# Patient Record
Sex: FEMALE | Race: BLACK OR AFRICAN AMERICAN | NOT HISPANIC OR LATINO | URBAN - METROPOLITAN AREA
[De-identification: names, ages, dates, MRNs, and addresses within clinical notes are randomized per-mention and may not be internally consistent; named-entity substitution may affect disease eponyms.]

---

## 2019-05-09 ENCOUNTER — HISTORICAL (OUTPATIENT)
Dept: PREADMISSION TESTING | Facility: HOSPITAL | Age: 49
End: 2019-05-09

## 2019-05-09 ENCOUNTER — HISTORICAL (OUTPATIENT)
Dept: BARIATRICS | Facility: HOSPITAL | Age: 49
End: 2019-05-09

## 2019-05-09 LAB — H PYLORI AB SER IA-ACNC: NEGATIVE

## 2019-05-22 ENCOUNTER — HISTORICAL (OUTPATIENT)
Dept: ADMINISTRATIVE | Facility: HOSPITAL | Age: 49
End: 2019-05-22

## 2019-05-22 LAB
ABS NEUT (OLG): 4.3 X10(3)/MCL (ref 2.1–9.2)
ALBUMIN SERPL-MCNC: 4 GM/DL (ref 3.4–5)
ALBUMIN/GLOB SERPL: 0.98 {RATIO} (ref 1.5–2.5)
ALP SERPL-CCNC: 61 UNIT/L (ref 38–126)
ALT SERPL-CCNC: 28 UNIT/L (ref 7–52)
AST SERPL-CCNC: 33 UNIT/L (ref 15–37)
BILIRUB SERPL-MCNC: 0.3 MG/DL (ref 0.2–1)
BILIRUBIN DIRECT+TOT PNL SERPL-MCNC: 0.1 MG/DL (ref 0–0.5)
BILIRUBIN DIRECT+TOT PNL SERPL-MCNC: 0.2 MG/DL
BUN SERPL-MCNC: 18 MG/DL (ref 7–18)
CALCIUM SERPL-MCNC: 9.4 MG/DL (ref 8.5–10)
CHLORIDE SERPL-SCNC: 100 MMOL/L (ref 98–107)
CHOLEST SERPL-MCNC: 256 MG/DL (ref 0–200)
CHOLEST/HDLC SERPL: 4.4 {RATIO}
CO2 SERPL-SCNC: 26 MMOL/L (ref 21–32)
CREAT SERPL-MCNC: 0.83 MG/DL (ref 0.6–1.3)
ERYTHROCYTE [DISTWIDTH] IN BLOOD BY AUTOMATED COUNT: 13.6 % (ref 11.5–17)
GLOBULIN SER-MCNC: 4.1 GM/DL (ref 1.2–3)
GLUCOSE SERPL-MCNC: 111 MG/DL (ref 74–106)
HCT VFR BLD AUTO: 35.2 % (ref 37–47)
HDLC SERPL-MCNC: 58 MG/DL (ref 35–60)
HGB BLD-MCNC: 11.5 GM/DL (ref 12–16)
LDLC SERPL CALC-MCNC: 122 MG/DL (ref 0–129)
LYMPHOCYTES # BLD AUTO: 2.4 X10(3)/MCL (ref 0.6–3.4)
LYMPHOCYTES NFR BLD AUTO: 33.7 % (ref 13–40)
MCH RBC QN AUTO: 27.6 PG (ref 27–31.2)
MCHC RBC AUTO-ENTMCNC: 33 GM/DL (ref 32–36)
MCV RBC AUTO: 85 FL (ref 80–94)
MONOCYTES # BLD AUTO: 0.5 X10(3)/MCL (ref 0.1–1.3)
MONOCYTES NFR BLD AUTO: 7 % (ref 0.1–24)
NEUTROPHILS NFR BLD AUTO: 59.3 % (ref 47–80)
PLATELET # BLD AUTO: 335 X10(3)/MCL (ref 130–400)
PMV BLD AUTO: 9 FL (ref 9.4–12.4)
POTASSIUM SERPL-SCNC: 4.7 MMOL/L (ref 3.5–5.1)
PROT SERPL-MCNC: 8.1 GM/DL (ref 6.4–8.2)
RBC # BLD AUTO: 4.16 X10(6)/MCL (ref 4.2–5.4)
SODIUM SERPL-SCNC: 135 MMOL/L (ref 136–145)
TRIGL SERPL-MCNC: 191 MG/DL (ref 30–150)
TSH SERPL-ACNC: 0.93 MIU/ML (ref 0.35–4.94)
VLDLC SERPL CALC-MCNC: 38.2 MG/DL
WBC # SPEC AUTO: 7.2 X10(3)/MCL (ref 4.5–11.5)

## 2019-06-07 ENCOUNTER — HISTORICAL (OUTPATIENT)
Dept: BARIATRICS | Facility: HOSPITAL | Age: 49
End: 2019-06-07

## 2019-06-14 ENCOUNTER — HISTORICAL (OUTPATIENT)
Dept: SURGERY | Facility: HOSPITAL | Age: 49
End: 2019-06-14

## 2019-07-12 ENCOUNTER — HISTORICAL (OUTPATIENT)
Dept: BARIATRICS | Facility: HOSPITAL | Age: 49
End: 2019-07-12

## 2019-07-16 ENCOUNTER — HISTORICAL (OUTPATIENT)
Dept: BARIATRICS | Facility: HOSPITAL | Age: 49
End: 2019-07-16

## 2019-08-01 ENCOUNTER — HISTORICAL (OUTPATIENT)
Dept: BARIATRICS | Facility: HOSPITAL | Age: 49
End: 2019-08-01

## 2019-08-19 ENCOUNTER — HISTORICAL (OUTPATIENT)
Dept: BARIATRICS | Facility: HOSPITAL | Age: 49
End: 2019-08-19

## 2019-10-04 ENCOUNTER — HISTORICAL (OUTPATIENT)
Dept: ADMINISTRATIVE | Facility: HOSPITAL | Age: 49
End: 2019-10-04

## 2019-10-04 LAB
ALBUMIN SERPL-MCNC: 4.4 GM/DL (ref 3.4–5)
ALBUMIN/GLOB SERPL: 1.2 RATIO (ref 1.1–2)
ALP SERPL-CCNC: 84 UNIT/L (ref 38–126)
ALT SERPL-CCNC: 24 UNIT/L (ref 12–78)
AST SERPL-CCNC: 20 UNIT/L (ref 15–37)
BILIRUB SERPL-MCNC: 0.8 MG/DL (ref 0.2–1)
BILIRUBIN DIRECT+TOT PNL SERPL-MCNC: 0.1 MG/DL (ref 0–0.5)
BILIRUBIN DIRECT+TOT PNL SERPL-MCNC: 0.7 MG/DL (ref 0–0.8)
BUN SERPL-MCNC: 22 MG/DL (ref 7–18)
CALCIUM SERPL-MCNC: 10 MG/DL (ref 8.5–10.1)
CHLORIDE SERPL-SCNC: 103 MMOL/L (ref 98–107)
CO2 SERPL-SCNC: 29 MMOL/L (ref 21–32)
CREAT SERPL-MCNC: 1.08 MG/DL (ref 0.55–1.02)
ERYTHROCYTE [DISTWIDTH] IN BLOOD BY AUTOMATED COUNT: 14.6 % (ref 11.5–17)
GLOBULIN SER-MCNC: 3.6 GM/DL (ref 2.4–3.5)
GLUCOSE SERPL-MCNC: 107 MG/DL (ref 74–106)
HCT VFR BLD AUTO: 36.4 % (ref 37–47)
HGB BLD-MCNC: 11.1 GM/DL (ref 12–16)
IRON SATN MFR SERPL: 8.2 % (ref 20–50)
IRON SERPL-MCNC: 29 MCG/DL (ref 50–175)
MCH RBC QN AUTO: 26.1 PG (ref 27–31)
MCHC RBC AUTO-ENTMCNC: 30.5 GM/DL (ref 33–36)
MCV RBC AUTO: 85.6 FL (ref 80–94)
PLATELET # BLD AUTO: 315 X10(3)/MCL (ref 130–400)
PMV BLD AUTO: 10.3 FL (ref 9.4–12.4)
POTASSIUM SERPL-SCNC: 3.9 MMOL/L (ref 3.5–5.1)
PROT SERPL-MCNC: 8 GM/DL (ref 6.4–8.2)
RBC # BLD AUTO: 4.25 X10(6)/MCL (ref 4.2–5.4)
SODIUM SERPL-SCNC: 139 MMOL/L (ref 136–145)
TIBC SERPL-MCNC: 353 MCG/DL (ref 250–450)
TRANSFERRIN SERPL-MCNC: 272 MG/DL (ref 200–360)
VIT B12 SERPL-MCNC: 760 PG/ML (ref 193–986)
WBC # SPEC AUTO: 4.6 X10(3)/MCL (ref 4.5–11.5)

## 2019-10-08 ENCOUNTER — HISTORICAL (OUTPATIENT)
Dept: BARIATRICS | Facility: HOSPITAL | Age: 49
End: 2019-10-08

## 2019-12-05 ENCOUNTER — HISTORICAL (OUTPATIENT)
Dept: BARIATRICS | Facility: HOSPITAL | Age: 49
End: 2019-12-05

## 2020-04-30 ENCOUNTER — HISTORICAL (OUTPATIENT)
Dept: BARIATRICS | Facility: HOSPITAL | Age: 50
End: 2020-04-30

## 2020-09-29 ENCOUNTER — HISTORICAL (OUTPATIENT)
Dept: ADMINISTRATIVE | Facility: HOSPITAL | Age: 50
End: 2020-09-29

## 2020-09-29 LAB
ALBUMIN SERPL-MCNC: 3.8 GM/DL (ref 3.5–5)
ALBUMIN/GLOB SERPL: 1 RATIO (ref 1.1–2)
ALP SERPL-CCNC: 109 UNIT/L (ref 40–150)
ALT SERPL-CCNC: 17 UNIT/L (ref 0–55)
AST SERPL-CCNC: 21 UNIT/L (ref 5–34)
BILIRUB SERPL-MCNC: 0.3 MG/DL
BILIRUBIN DIRECT+TOT PNL SERPL-MCNC: 0.1 MG/DL (ref 0–0.5)
BILIRUBIN DIRECT+TOT PNL SERPL-MCNC: 0.2 MG/DL (ref 0–0.8)
BUN SERPL-MCNC: 15.6 MG/DL (ref 9.8–20.1)
CALCIUM SERPL-MCNC: 9.2 MG/DL (ref 8.4–10.2)
CHLORIDE SERPL-SCNC: 102 MMOL/L (ref 98–107)
CO2 SERPL-SCNC: 30 MMOL/L (ref 22–29)
CREAT SERPL-MCNC: 0.89 MG/DL (ref 0.55–1.02)
ERYTHROCYTE [DISTWIDTH] IN BLOOD BY AUTOMATED COUNT: 12.8 % (ref 11.5–17)
GLOBULIN SER-MCNC: 3.7 GM/DL (ref 2.4–3.5)
GLUCOSE SERPL-MCNC: 90 MG/DL (ref 74–100)
HCT VFR BLD AUTO: 36 % (ref 37–47)
HGB BLD-MCNC: 11.4 GM/DL (ref 12–16)
IRON SATN MFR SERPL: 19 % (ref 20–50)
IRON SERPL-MCNC: 69 UG/DL (ref 50–170)
MCH RBC QN AUTO: 27.5 PG (ref 27–31)
MCHC RBC AUTO-ENTMCNC: 31.7 GM/DL (ref 33–36)
MCV RBC AUTO: 87 FL (ref 80–94)
PLATELET # BLD AUTO: 297 X10(3)/MCL (ref 130–400)
PMV BLD AUTO: 10.4 FL (ref 9.4–12.4)
POTASSIUM SERPL-SCNC: 4.1 MMOL/L (ref 3.5–5.1)
PROT SERPL-MCNC: 7.5 GM/DL (ref 6.4–8.3)
RBC # BLD AUTO: 4.14 X10(6)/MCL (ref 4.2–5.4)
SODIUM SERPL-SCNC: 139 MMOL/L (ref 136–145)
TIBC SERPL-MCNC: 285 UG/DL (ref 70–310)
TIBC SERPL-MCNC: 354 UG/DL (ref 250–450)
TRANSFERRIN SERPL-MCNC: 274 MG/DL (ref 180–382)
VIT B12 SERPL-MCNC: 475 PG/ML (ref 213–816)
WBC # SPEC AUTO: 4.5 X10(3)/MCL (ref 4.5–11.5)

## 2022-04-10 ENCOUNTER — HISTORICAL (OUTPATIENT)
Dept: ADMINISTRATIVE | Facility: HOSPITAL | Age: 52
End: 2022-04-10

## 2022-04-29 NOTE — OP NOTE
Patient:   Caitlin Wing            MRN: [id number]            FIN: [id number]               Age:   48 years     Sex:  Female     :  1970   Associated Diagnoses:   HEARTBURN; Hernia, hiatal   Author:   Kevin Turcios MD      Operative Note   Operative Information   Date/ Time:  2019 22:22:00.     Procedures Performed: Procedure Code   Esophagogastroduodenoscopy on 2019 at 48 Years.  Comments:  2019 8:42 ANKUR - Lejeune RN, Neymar Dalal  auto-populated from documented surgical case, Esophagogastroduodenoscopy.     Preoperative Diagnosis: HEARTBURN (GYH71-DR R12).     Postoperative Diagnosis: HEARTBURN (CFH20-PX R12), Hernia, hiatal (XXG23-OA K44.9).     Surgeon: Kevin Turcios MD.     Anesthesia: MAC.     Description of Procedure/Findings/    Complications: Patient was taken to the OR.  The patient's throat was aneshtetized.  MAC was utilized for anesthesia.   was easily passed.  Findings were as follows:  -Oropharynx: normal  -Airway: normal  -Esophagus: normal  -Stomach: normal mucosa, Type 2 HH  -Duodenum: normal.     Esimated blood loss: No blood loss.     Complications: None.

## 2022-04-29 NOTE — H&P
Patient:   Caitlin Wing            MRN: [id number]            FIN: [id number]               Age:   48 years     Sex:  Female     :  1970   Associated Diagnoses:   Heartburn   Author:   Pretty Weiss      Basic Information   Source of history:  Self.    History limitation:  None.       History of Present Illness   Mrs. Wing is a 49 yo female that presents to [facility] for EGD. She is currently undergoing wokrup for bariatric surgery. Hx of GERD. H Pylori negative.       Review of Systems   Constitutional:  Negative.    Eye:  Negative.    Ear/Nose/Mouth/Throat:  Negative.    Respiratory:  Negative.    Cardiovascular:  Negative.    Gastrointestinal:  Negative.    Genitourinary:  Negative.    Gynecologic:  Negative.    Hematology/Lymphatics:  Negative.    Endocrine:  Negative.    Immunologic:  Negative.    Musculoskeletal:  Negative.    Integumentary:  Negative.    Neurologic:  Negative.    Psychiatric:  Negative.       Health Status   Allergies:    Allergic Reactions (Selected)  No Known Medication Allergies   Current medications:  (Selected)   Inpatient Medications  Ordered  Lactated Ringers Injection intravenous solution 1000 mL: 1,000 mL, 1,000 mL, IV, 75 mL/hr, start date 19 7:13:00 CDT  Prescriptions  Prescribed  Wellbutrin  mg/24 hours oral tablet, extended release: 150 mg = 1 tab(s), Oral, q24hr, # 30 tab(s), 5 Refill(s), Pharmacy: [facility]/pharmacy #[id number]  Documented Medications  Documented  multivitamin with minerals (Adult Tab): 1 tab(s), Oral, Daily, # 30 tab(s), 0 Refill(s)  sertraline 100 mg oral tablet: 200 mg = 2 tab(s), Oral, Daily, # 30 tab(s), 0 Refill(s)   Problem list:    All Problems  Chronic GERD / SNOMED CT 921889005 / Confirmed  Hyperlipidemia / SNOMED CT 05054164 / Confirmed  Depression / SNOMED CT 57752699 / Confirmed  Panic attack / SNOMED CT 131440281 / Confirmed  Obesity / SNOMED CT 8563458195 / Probable  Anxiety / SNOMED CT 84886793 / Confirmed       Histories   Past Medical History:    No active or resolved past medical history items have been selected or recorded.   Family History:    Hypothyroidism  Mother  CAD - Coronary artery disease  Father ()  Hypertension.  Father ()  Depression.  Father ()  Hyperlipidemia.  Mother     Procedure history:    Tubal ligation (SNOMED CT 448349294).  Extraction of impacted wisdom tooth (SNOMED CT 838114037).   Social History        Social & Psychosocial Habits    Alcohol  2019  Use: Past    Employment/School  2019  Status: Employed    Home/Environment  2019  Lives with: Children, Spouse    Nutrition/Health  2019  Type of diet: Regular    Substance Use  2019  Use: Never    Tobacco  2019  Use: Never (less than 100 in l    Patient Wants Consult For Cessation Counseling N/A.        Physical Examination   Vital Signs   2019 7:09 CDT       Temperature Oral          36.7 DegC                             Temperature Oral (calculated)             98.06 DegF                             Peripheral Pulse Rate     72 bpm                             Respiratory Rate          20 br/min                             SpO2                      98 %                             Systolic Blood Pressure   116 mmHg                             Diastolic Blood Pressure  68 mmHg                             Mean Arterial Pressure, Cuff              84 mmHg     General:  Alert and oriented, No acute distress.    Neck:  Supple.    Respiratory:  Lungs are clear to auscultation, Respirations are non-labored.    Cardiovascular:  Normal rate, Regular rhythm.    Gastrointestinal:  Soft, Non-tender.    Musculoskeletal:  Normal range of motion.    Integumentary:  Warm, Dry.    Neurologic:  Alert, Oriented.    Psychiatric:  Cooperative, Appropriate mood & affect.       Impression and Plan   Diagnosis     Heartburn (EBC84-AM R12).     Education and Follow-up:       Counseled: Patient, Regarding  diagnosis, Regarding treatment.    EGD today  NPO for EGD

## 2022-04-30 VITALS
SYSTOLIC BLOOD PRESSURE: 122 MMHG | HEIGHT: 61 IN | BODY MASS INDEX: 36.71 KG/M2 | DIASTOLIC BLOOD PRESSURE: 72 MMHG | WEIGHT: 194.44 LBS

## 2022-05-02 NOTE — HISTORICAL [facility] CERNER
This is a historical note converted from [employer]. Formatting and pictures may have been removed.  Please reference Cerfelicita for original formatting and attached multimedia. Chief Complaint  NP WELLNESS CPX NONFAST  History of Present Illness  48 year old AAF presents fasting for annual wellness  PMH: GERD, Depression, HLD  Moved here from [place], [place] 3 years ago and has not seen PCP here yet. Wants to get established.  ?  ?  , 2 kids, Works consulting for [employer]  No Tob, No EtOH  Coffee: 1 cup/day, 1 diet coke/day  No exercise  ?  GYN (PAP/MMG UTD)- Had done last year in [place], [place]  ?  Patient has been noticing about 20lb wt gain over past 2 years. Understands that some of it may be due to moving to [place] [place] [facility]\"" and adjusting to culture here.  Has been on Zoloft 300mg for past 2 years.  Review of Systems  Constitutional:?no fever, fatigue, weakness  Eye:?no vision loss, eye redness, drainage, or pain  ENMT:?no sore throat, ear pain, sinus pain/congestion, nasal congestion/drainage  Respiratory:?no cough, no wheezing, no shortness of breath  Cardiovascular:?no chest pain, no palpitations, no edema  Gastrointestinal:?no nausea, vomiting, or diarrhea. No abdominal pain  Genitourinary:?no dysuria, no urinary frequency or urgency, no hematuria  Hema/Lymph:?no abnormal bruising or bleeding  Endocrine:?no heat or cold intolerance, no excessive thirst or excessive urination  Musculoskeletal:?no muscle or joint pain, no joint swelling  Integumentary:?no skin rash or abnormal lesion  Neurologic: no headache, no dizziness, no weakness or numbness  ?  Physical Exam  Vitals & Measurements  T:?37.1? ?C (Oral)? HR:?64(Peripheral)? BP:?122/72?  HT:?155?cm? WT:?91.1?kg? BMI:?37.92?  General:?well-developed well-nourished in no acute distress  Eye: PERRLA, EOMI, clear conjunctiva, eyelids normal  HENT:?TMs/ear canals clear, oropharynx without erythema/exudate, oropharynx and nasal mucosal surfaces moist, no  maxillary/frontal sinus tenderness to palpation  Neck: full range of motion, no thyromegaly or lymphadenopathy  Respiratory:?clear to auscultation bilaterally  Cardiovascular:?regular rate and rhythm without murmurs, gallops or rubs  Gastrointestinal:?soft, non-tender, non-distended with normal bowel sounds, without masses to palpation  Genitourinary: no CVA tenderness to palpation  Musculoskeletal:?full range of motion of all extremities/spine without limitation or discomfort  Integumentary: no rashes or skin lesions present  Neurologic: cranial nerves intact, no signs of peripheral neurological deficit, motor/sensory function intact  ?  Assessment/Plan  1.?Wellness examination?Z00.00  ?LABS: CBC, CMP, TSH, FLP  Ordered:  Automated Diff, Routine collect, 05/22/19 10:48:00 CDT, Blood, Collected, Stop date 05/22/19 10:48:00 CDT, Lab Collect, Wellness examination, 05/22/19 10:48:00 CDT  CBC w/ Auto Diff, Routine collect, 05/22/19 10:48:00 CDT, Blood, Stop date 05/22/19 10:48:00 CDT, Lab Collect, Wellness examination, 05/22/19 10:48:00 CDT  Comprehensive Metabolic Panel, Now collect, 05/22/19 10:48:00 CDT, Blood, Stop date 05/22/19 10:48:00 CDT, Lab Collect, Wellness examination, 05/22/19 10:48:00 CDT  Lipid Panel, Routine collect, 05/22/19 10:48:00 CDT, Blood, Stop date 05/22/19 10:48:00 CDT, Lab Collect, Wellness examination, 05/22/19 10:48:00 CDT  Preventative Health Care New 40-64 years [id number] PC, Wellness examination, INK AMB - AFP, 05/22/19 10:52:00 CDT  Preventative Health Care New 40-64 years [id number] PC, Wellness examination, HLINK AMB - AFP, 05/22/19 10:27:00 CDT  Thyroid Stimulating Hormone, Routine collect, 05/22/19 10:48:00 CDT, Blood, Stop date 05/22/19 10:48:00 CDT, Lab Collect, Wellness examination, 05/22/19 10:48:00 CDT  ?  2.?Chronic GERD?K21.9  ?Diet controlled  ?  3.?Hyperlipidemia?E78.5  ?Diet controlled  ?  4.?Depression?F32.9  Continue Zoloft 100mg (3) PO q day  Add Wellbutrin XL 150mg PO q day  (30,5)  ?  Continue Zoloft 300mg daily for 2 weeks then decrease to 200mg PO q day x 2 weeks  F/U in 4 weeks  ?   Problem List/Past Medical History  Ongoing  Chronic GERD  Depression  Hyperlipidemia  Obesity  Panic attack  Historical  No qualifying data  Procedure/Surgical History  Extraction of impacted wisdom tooth  Tubal ligation   Medications  sertraline 100 mg oral tablet, 300 mg= 3 tab(s), Oral, Daily  Allergies  No Known Medication Allergies  Social History  Alcohol  Past, 05/22/2019  Employment/School  Employed, 05/22/2019  Home/Environment  Lives with Children, Spouse., 05/22/2019  Nutrition/Health  Regular, 05/22/2019  Substance Abuse  Never, 05/22/2019  Tobacco  Never (less than 100 in lifetime), N/A, 05/22/2019  Family History  CAD - Coronary artery disease: Father.  Depression.: Father.  Hyperlipidemia.: Mother.  Hypertension.: Father.  Hypothyroidism: Mother.  Immunizations  Vaccine Date Status   tetanus/diphtheria/pertussis, acel(Tdap) 05/22/2019 Given   Health Maintenance  Health Maintenance  ???Pending?(in the next year)  ???There are no current recommendations pending  ??? ??Due In Future?  ??? ? ? ?Alcohol Misuse Screening not due until??01/01/20??and every 1??year(s)  ??? ? ? ?Obesity Screening not due until??01/01/20??and every 1??year(s)  ???Satisfied?(in the past 1 year)  ??? ??Satisfied?  ??? ? ? ?ADL Screening on??05/22/19.??Satisfied by Lorie Tadeo LPN  ??? ? ? ?Alcohol Misuse Screening on??05/22/19.??Satisfied by Lorie Tadeo LPN  ??? ? ? ?Blood Pressure Screening on??05/22/19.??Satisfied by Lorie Tadeo LPN  ??? ? ? ?Body Mass Index Check on??05/22/19.??Satisfied by Lorie Tadeo LPN  ??? ? ? ?Depression Screening on??05/22/19.??Satisfied by Lorie Tadeo LPN  ??? ? ? ?Influenza Vaccine on??05/22/19.??Satisfied by Lorie Tadeo LPN  ??? ? ? ?Obesity Screening on??05/22/19.??Satisfied by Lorie Tadeo LPN  ??? ? ? ?Tetanus Vaccine on??05/22/19.??Satisfied by  Carlyle WELDON, Lorie  ?  ?

## 2023-09-18 ENCOUNTER — TELEPHONE (OUTPATIENT)
Dept: SURGERY | Facility: CLINIC | Age: 53
End: 2023-09-18

## 2024-06-25 ENCOUNTER — TELEPHONE (OUTPATIENT)
Dept: SURGERY | Facility: CLINIC | Age: 54
End: 2024-06-25

## 2025-07-03 ENCOUNTER — TELEPHONE (OUTPATIENT)
Dept: SURGERY | Facility: CLINIC | Age: 55
End: 2025-07-03